# Patient Record
Sex: FEMALE | Race: BLACK OR AFRICAN AMERICAN | NOT HISPANIC OR LATINO | URBAN - METROPOLITAN AREA
[De-identification: names, ages, dates, MRNs, and addresses within clinical notes are randomized per-mention and may not be internally consistent; named-entity substitution may affect disease eponyms.]

---

## 2017-11-21 ENCOUNTER — EMERGENCY (EMERGENCY)
Facility: HOSPITAL | Age: 52
LOS: 1 days | Discharge: HOME | End: 2017-11-21
Attending: EMERGENCY MEDICINE
Payer: COMMERCIAL

## 2017-11-21 VITALS
OXYGEN SATURATION: 100 % | DIASTOLIC BLOOD PRESSURE: 86 MMHG | WEIGHT: 175.05 LBS | HEART RATE: 94 BPM | TEMPERATURE: 98 F | HEIGHT: 66 IN | SYSTOLIC BLOOD PRESSURE: 137 MMHG | RESPIRATION RATE: 20 BRPM

## 2017-11-21 DIAGNOSIS — R42 DIZZINESS AND GIDDINESS: ICD-10-CM

## 2017-11-21 DIAGNOSIS — R00.2 PALPITATIONS: ICD-10-CM

## 2017-11-21 LAB
ANION GAP SERPL CALC-SCNC: 8 MMOL/L — SIGNIFICANT CHANGE UP (ref 5–17)
BASOPHILS # BLD AUTO: 0.1 K/UL — SIGNIFICANT CHANGE UP (ref 0–0.2)
BASOPHILS NFR BLD AUTO: 1.3 % — SIGNIFICANT CHANGE UP (ref 0–2)
BUN SERPL-MCNC: 11 MG/DL — SIGNIFICANT CHANGE UP (ref 7–18)
CALCIUM SERPL-MCNC: 8.9 MG/DL — SIGNIFICANT CHANGE UP (ref 8.4–10.5)
CHLORIDE SERPL-SCNC: 105 MMOL/L — SIGNIFICANT CHANGE UP (ref 96–108)
CO2 SERPL-SCNC: 26 MMOL/L — SIGNIFICANT CHANGE UP (ref 22–31)
CREAT SERPL-MCNC: 0.65 MG/DL — SIGNIFICANT CHANGE UP (ref 0.5–1.3)
D DIMER BLD IA.RAPID-MCNC: <150 NG/ML DDU — SIGNIFICANT CHANGE UP
EOSINOPHIL # BLD AUTO: 0 K/UL — SIGNIFICANT CHANGE UP (ref 0–0.5)
EOSINOPHIL NFR BLD AUTO: 0.5 % — SIGNIFICANT CHANGE UP (ref 0–6)
GLUCOSE SERPL-MCNC: 88 MG/DL — SIGNIFICANT CHANGE UP (ref 70–99)
HCT VFR BLD CALC: 38.7 % — SIGNIFICANT CHANGE UP (ref 34.5–45)
HGB BLD-MCNC: 12.2 G/DL — SIGNIFICANT CHANGE UP (ref 11.5–15.5)
LYMPHOCYTES # BLD AUTO: 1.4 K/UL — SIGNIFICANT CHANGE UP (ref 1–3.3)
LYMPHOCYTES # BLD AUTO: 26.6 % — SIGNIFICANT CHANGE UP (ref 13–44)
MCHC RBC-ENTMCNC: 21.5 PG — LOW (ref 27–34)
MCHC RBC-ENTMCNC: 31.6 GM/DL — LOW (ref 32–36)
MCV RBC AUTO: 68 FL — LOW (ref 80–100)
MONOCYTES # BLD AUTO: 0.4 K/UL — SIGNIFICANT CHANGE UP (ref 0–0.9)
MONOCYTES NFR BLD AUTO: 8.3 % — SIGNIFICANT CHANGE UP (ref 2–14)
NEUTROPHILS # BLD AUTO: 3.3 K/UL — SIGNIFICANT CHANGE UP (ref 1.8–7.4)
NEUTROPHILS NFR BLD AUTO: 63.3 % — SIGNIFICANT CHANGE UP (ref 43–77)
PLATELET # BLD AUTO: 197 K/UL — SIGNIFICANT CHANGE UP (ref 150–400)
POTASSIUM SERPL-MCNC: 3.3 MMOL/L — LOW (ref 3.5–5.3)
POTASSIUM SERPL-SCNC: 3.3 MMOL/L — LOW (ref 3.5–5.3)
RBC # BLD: 5.7 M/UL — HIGH (ref 3.8–5.2)
RBC # FLD: 15.4 % — HIGH (ref 10.3–14.5)
SODIUM SERPL-SCNC: 139 MMOL/L — SIGNIFICANT CHANGE UP (ref 135–145)
TROPONIN I SERPL-MCNC: <0.015 NG/ML — SIGNIFICANT CHANGE UP (ref 0–0.04)
WBC # BLD: 5.3 K/UL — SIGNIFICANT CHANGE UP (ref 3.8–10.5)
WBC # FLD AUTO: 5.3 K/UL — SIGNIFICANT CHANGE UP (ref 3.8–10.5)

## 2017-11-21 PROCEDURE — 70450 CT HEAD/BRAIN W/O DYE: CPT

## 2017-11-21 PROCEDURE — 80048 BASIC METABOLIC PNL TOTAL CA: CPT

## 2017-11-21 PROCEDURE — 85027 COMPLETE CBC AUTOMATED: CPT

## 2017-11-21 PROCEDURE — 82962 GLUCOSE BLOOD TEST: CPT

## 2017-11-21 PROCEDURE — 85379 FIBRIN DEGRADATION QUANT: CPT

## 2017-11-21 PROCEDURE — 93005 ELECTROCARDIOGRAM TRACING: CPT

## 2017-11-21 PROCEDURE — 71020: CPT | Mod: 26

## 2017-11-21 PROCEDURE — 99284 EMERGENCY DEPT VISIT MOD MDM: CPT | Mod: 25

## 2017-11-21 PROCEDURE — 96374 THER/PROPH/DIAG INJ IV PUSH: CPT

## 2017-11-21 PROCEDURE — 84484 ASSAY OF TROPONIN QUANT: CPT

## 2017-11-21 PROCEDURE — 99285 EMERGENCY DEPT VISIT HI MDM: CPT

## 2017-11-21 PROCEDURE — 70450 CT HEAD/BRAIN W/O DYE: CPT | Mod: 26

## 2017-11-21 PROCEDURE — 71046 X-RAY EXAM CHEST 2 VIEWS: CPT

## 2017-11-21 RX ORDER — MECLIZINE HCL 12.5 MG
25 TABLET ORAL ONCE
Qty: 0 | Refills: 0 | Status: COMPLETED | OUTPATIENT
Start: 2017-11-21 | End: 2017-11-21

## 2017-11-21 RX ORDER — KETOROLAC TROMETHAMINE 30 MG/ML
30 SYRINGE (ML) INJECTION ONCE
Qty: 0 | Refills: 0 | Status: DISCONTINUED | OUTPATIENT
Start: 2017-11-21 | End: 2017-11-21

## 2017-11-21 RX ADMIN — Medication 25 MILLIGRAM(S): at 17:28

## 2017-11-21 RX ADMIN — Medication 30 MILLIGRAM(S): at 17:29

## 2017-11-21 NOTE — ED PROVIDER NOTE - OBJECTIVE STATEMENT
53 y/o F pt with a significant PMHx of anemia and no significant PSHx presents to the ED c/o dizziness described as room spinning associated with nausea and palpitations, while at work today. Pt also reports feeling flushed notes numbness in arms bilaterally. Upon arrival to the ED, pt notes her dizziness is resolved, and that she currently only complaining of palpitations. Pt denies chest pain or any other complaints. NKDA.

## 2017-11-21 NOTE — ED PROVIDER NOTE - PROGRESS NOTE DETAILS
Pt reassessed, reports feeling better.  Dizziness and palpitations resolved.  Labs and imaging negative.  pt given copy of all results.  Plans to follow up with PMD.  Will d/c

## 2018-11-01 ENCOUNTER — EMERGENCY (EMERGENCY)
Facility: HOSPITAL | Age: 53
LOS: 1 days | Discharge: HOME | End: 2018-11-01
Attending: EMERGENCY MEDICINE
Payer: SELF-PAY

## 2018-11-01 VITALS
WEIGHT: 171.96 LBS | HEIGHT: 66 IN | RESPIRATION RATE: 18 BRPM | DIASTOLIC BLOOD PRESSURE: 100 MMHG | SYSTOLIC BLOOD PRESSURE: 158 MMHG | HEART RATE: 80 BPM | OXYGEN SATURATION: 100 % | TEMPERATURE: 99 F

## 2018-11-01 VITALS
SYSTOLIC BLOOD PRESSURE: 146 MMHG | OXYGEN SATURATION: 100 % | DIASTOLIC BLOOD PRESSURE: 84 MMHG | RESPIRATION RATE: 18 BRPM | HEART RATE: 63 BPM

## 2018-11-01 DIAGNOSIS — Z98.51 TUBAL LIGATION STATUS: Chronic | ICD-10-CM

## 2018-11-01 PROBLEM — D64.9 ANEMIA, UNSPECIFIED: Chronic | Status: ACTIVE | Noted: 2017-11-21

## 2018-11-01 PROCEDURE — 72100 X-RAY EXAM L-S SPINE 2/3 VWS: CPT

## 2018-11-01 PROCEDURE — 99284 EMERGENCY DEPT VISIT MOD MDM: CPT | Mod: 25

## 2018-11-01 PROCEDURE — 99284 EMERGENCY DEPT VISIT MOD MDM: CPT

## 2018-11-01 PROCEDURE — 72100 X-RAY EXAM L-S SPINE 2/3 VWS: CPT | Mod: 26

## 2018-11-01 PROCEDURE — 20552 NJX 1/MLT TRIGGER POINT 1/2: CPT

## 2018-11-01 RX ORDER — METHOCARBAMOL 500 MG/1
1 TABLET, FILM COATED ORAL
Qty: 30 | Refills: 0 | OUTPATIENT
Start: 2018-11-01 | End: 2018-11-10

## 2018-11-01 RX ORDER — IBUPROFEN 200 MG
1 TABLET ORAL
Qty: 40 | Refills: 0 | OUTPATIENT
Start: 2018-11-01 | End: 2018-11-10

## 2018-11-01 RX ORDER — GABAPENTIN 400 MG/1
400 CAPSULE ORAL ONCE
Qty: 0 | Refills: 0 | Status: COMPLETED | OUTPATIENT
Start: 2018-11-01 | End: 2018-11-01

## 2018-11-01 RX ORDER — METHOCARBAMOL 500 MG/1
750 TABLET, FILM COATED ORAL ONCE
Qty: 0 | Refills: 0 | Status: COMPLETED | OUTPATIENT
Start: 2018-11-01 | End: 2018-11-01

## 2018-11-01 RX ORDER — IBUPROFEN 200 MG
600 TABLET ORAL ONCE
Qty: 0 | Refills: 0 | Status: COMPLETED | OUTPATIENT
Start: 2018-11-01 | End: 2018-11-01

## 2018-11-01 RX ADMIN — Medication 600 MILLIGRAM(S): at 13:23

## 2018-11-01 RX ADMIN — Medication 40 MILLIGRAM(S): at 13:39

## 2018-11-01 RX ADMIN — METHOCARBAMOL 750 MILLIGRAM(S): 500 TABLET, FILM COATED ORAL at 13:23

## 2018-11-01 RX ADMIN — GABAPENTIN 400 MILLIGRAM(S): 400 CAPSULE ORAL at 13:22

## 2018-11-01 NOTE — ED PROVIDER NOTE - OBJECTIVE STATEMENT
Pt is a 52 y/o F presenting to the ED with c/o right lower back pain, sudden onset today. 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 She has been having constant pain since onset with radiation of pain to the RLE. Denies abd pain, CP, SOB, fever, numbness/tingling/weakness, bladder/bowel dysfunction, urinary sxs, and saddle anesthesia. No prior back injuries. Did not take pain meds prior to arrival.

## 2018-11-01 NOTE — CONSULT NOTE ADULT - SUBJECTIVE AND OBJECTIVE BOX
HPI: Patient is a 54 y/o F presenting to the ED with c/o right lower back pain, sudden onset today. 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 She has been having constant pain since onset with radiation of pain to the RLE.    PAST MEDICAL & SURGICAL HISTORY:  Anemia  Migraine  H/O tubal ligation    Review of systems: Non Contributory    MEDICATIONS  (STANDING):    Allergies: No known Allergies    Vital Signs Last 24 Hrs  T(C): 37.2 (01 Nov 2018 12:24), Max: 37.2 (01 Nov 2018 12:24)  T(F): 98.9 (01 Nov 2018 12:24), Max: 98.9 (01 Nov 2018 12:24)  HR: 63 (01 Nov 2018 15:00) (63 - 80)  BP: 146/84 (01 Nov 2018 15:00) (146/84 - 158/100)  BP(mean): --  RR: 18 (01 Nov 2018 15:00) (18 - 18)  SpO2: 100% (01 Nov 2018 15:00) (100% - 100%)    Physical Examination:    Musculoskeletal:   Physical examination of lower back/lumbar spine area shows local point tenderness to palpation of the right lower lumbar/SI joint area. There is palpable right paravertebral muscle spasm noted. Range of motion is decreased due to pain.     Neurovascularly Intact    RADIOLOGY & ADDITIONAL STUDIES: X-ray of Lumbar spine done in the ER shows: Slight scattered spondylitic change.    ASSESSMENT: Lumbar sprain, right sided SI joint pain/ right paravertebral lower lumbar muscle spasm, s/p work related injury.      PLAN/RECOMMENDATION: Conservative management, Rest, Apply warm compress. Take anti-inflammatory medication and muscle relaxants. Apply Voltaren gel.     Recommend MRI of Lumbar spine in case of continuation of symptoms.     FOLLOW UP: With office in 1-2 weeks HPI: Patient is a 54 y/o F presenting to the ED with c/o right lower back pain, sudden onset today. 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 She has been having constant pain since onset with radiation of pain to the RLE.    PAST MEDICAL & SURGICAL HISTORY:  Anemia  Migraine  H/O tubal ligation    Review of systems: Non Contributory    MEDICATIONS  (STANDING):    Allergies: No known Allergies    Vital Signs Last 24 Hrs  T(C): 37.2 (01 Nov 2018 12:24), Max: 37.2 (01 Nov 2018 12:24)  T(F): 98.9 (01 Nov 2018 12:24), Max: 98.9 (01 Nov 2018 12:24)  HR: 63 (01 Nov 2018 15:00) (63 - 80)  BP: 146/84 (01 Nov 2018 15:00) (146/84 - 158/100)  BP(mean): --  RR: 18 (01 Nov 2018 15:00) (18 - 18)  SpO2: 100% (01 Nov 2018 15:00) (100% - 100%)    Physical Examination:    Musculoskeletal:   Physical examination of lower back/lumbar spine area shows local point tenderness to palpation of the right lower lumbar/SI joint area. There is palpable right paravertebral muscle spasm noted. Range of motion is decreased due to pain.     Neurovascularly Intact    RADIOLOGY & ADDITIONAL STUDIES: X-ray of Lumbar spine done in the ER shows: Slight scattered spondylitic change.    ASSESSMENT: Lumbar sprain, right sided SI joint pain/ right paravertebral lower lumbar muscle spasm, s/p work related injury.      PLAN/RECOMMENDATION: Under sterile condition, right lower lumbar/SI joint trigger point area was injected with 40 mg of Depomedrol and 5 CC of lidocaine. Patient tolerated procedure well and expressed relief of pain.    Rest, Apply warm compress. Take anti-inflammatory medication and muscle relaxants. Apply Voltaren gel.     Recommend MRI of Lumbar spine in case of continuation of symptoms.     FOLLOW UP: With office in 1-2 weeks

## 2018-11-01 NOTE — ED PROVIDER NOTE - CHPI ED SYMPTOMS NEG
no motor function loss/no anorexia/no bowel dysfunction/no fatigue/no numbness/no neck tenderness/no constipation/no bladder dysfunction/no tingling/no difficulty bearing weight

## 2018-11-01 NOTE — ED PROVIDER NOTE - MUSCULOSKELETAL, MLM
tenderness to the right lower lumbar paraspinal, right cervical paraspinal tenderness, right elbow tenderness, no midline tenderness,

## 2019-02-14 ENCOUNTER — EMERGENCY (EMERGENCY)
Facility: HOSPITAL | Age: 54
LOS: 0 days | Discharge: LEFT BEFORE DISCHARGE | End: 2019-02-14
Attending: EMERGENCY MEDICINE
Payer: SELF-PAY

## 2019-02-14 VITALS
HEART RATE: 94 BPM | TEMPERATURE: 98 F | DIASTOLIC BLOOD PRESSURE: 80 MMHG | HEIGHT: 66 IN | RESPIRATION RATE: 17 BRPM | OXYGEN SATURATION: 99 % | SYSTOLIC BLOOD PRESSURE: 134 MMHG

## 2019-02-14 DIAGNOSIS — Z98.51 TUBAL LIGATION STATUS: Chronic | ICD-10-CM

## 2019-02-14 PROCEDURE — 99283 EMERGENCY DEPT VISIT LOW MDM: CPT | Mod: 25

## 2019-02-14 PROCEDURE — 99053 MED SERV 10PM-8AM 24 HR FAC: CPT

## 2019-02-14 RX ORDER — METHOCARBAMOL 500 MG/1
1 TABLET, FILM COATED ORAL
Qty: 15 | Refills: 0 | OUTPATIENT
Start: 2019-02-14 | End: 2019-02-18

## 2019-02-14 RX ORDER — IBUPROFEN 200 MG
1 TABLET ORAL
Qty: 20 | Refills: 0 | OUTPATIENT
Start: 2019-02-14 | End: 2019-02-18

## 2019-02-14 NOTE — ED ADULT NURSE NOTE - NSIMPLEMENTINTERV_GEN_ALL_ED
Implemented All Fall with Harm Risk Interventions:  [place] to call system. Call bell, personal items and telephone within reach. Instruct patient to call for assistance. Room bathroom lighting operational. Non-slip footwear when patient is off stretcher. Physically safe environment: no spills, clutter or unnecessary equipment. Stretcher in lowest position, wheels locked, appropriate side rails in place. Provide visual cue, wrist band, yellow gown, etc. Monitor gait and stability. Monitor for mental status changes and reorient to person, place, and time. Review medications for side effects contributing to fall risk. Reinforce activity limits and safety measures with patient and family. Provide visual clues: red socks.

## 2019-02-14 NOTE — ED PROVIDER NOTE - OBJECTIVE STATEMENT
Pertinent PMH/PSH/FHx/SHx and Review of Systems contained within:  Patient presents to the ED for abdominal pain.  Patient is s/p liposuction surgery on 2/5 in [place].  Came to the ER because of increased pain and pressure in the abdomen.  Says that her wound vac which was placed on the right side has not been draining.  She denies any fevers, nausea, vomiting, diarrhea, vaginal discharge, or bleeding.  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      ROS: No fever/chills, No headache/photophobia/eye pain/changes in vision, No ear pain/sore throat/dysphagia, No chest pain/palpitations, no SOB/cough/wheeze/stridor, No N/V/D/melena, no dysuria/frequency/discharge, No neck/back pain, no rash, no changes in neurological status/function.

## 2019-02-14 NOTE — ED PROVIDER NOTE - PHYSICAL EXAMINATION
Gen: Alert, moderate distress on walking, well appearing  Head: NC, AT, normal lids/conjunctiva  ENT: normal hearing, patent oropharynx without erythema/exudate, uvula midline  Neck: +supple, no tenderness/meningismus/JVD, +Trachea midline  Pulm: Bilateral BS, normal resp effort, no wheeze/stridor/retractions  CV: RRR, no M/R/G, +dist pulses  Abd: soft, tender to palpation both sides of abdomen, +RADHA drain on right side, lower abdominal incision appears clean and healing well, ND, no palpable masses  Mskel: no edema/erythema/cyanosis  Skin: no rash, warm/dry  Neuro: AAOx3, no apparent sensory/motor deficits, coordination intact

## 2019-02-14 NOTE — ED PROVIDER NOTE - CLINICAL SUMMARY MEDICAL DECISION MAKING FREE TEXT BOX
Patient presents to ER for post op abdominal pain.  VSS.  Refusing work up in ER, expresses understanding that drains cannot be changed in ER without further evaluation.   The patient has decided to leave against medical advice.  The patient is AAOx3, not intoxicated, and displays normal decision making ability. We discussed all risks, benefits, and alternatives to the progression of treatment and the potential dangers of leaving including but not limited to permanent disability, injury, and death.  The patient was instructed that they are welcome to change their decision to leave against medical advice and return to the emergency department at any time and for any reason in order to allow us to render care.  Patient given information for Dr. Payton prior to leaving.

## 2019-02-14 NOTE — ED ADULT NURSE NOTE - OBJECTIVE STATEMENT
Pt a&ox4. Pt explains that she had tummy tuck and liposuction Feb 5th, 2019 in [place].  She flew back into [place] last night.  Pt  said, "when I got home. I can see the fluid isn't coming.  I feel all the pain on the side of my waist. The drain is not good."  Pt c/o dislodges drain and pain.

## 2019-02-14 NOTE — ED ADULT TRIAGE NOTE - CHIEF COMPLAINT QUOTE
Pt presents one week s/p tummy tuck. states the drain fell out and the fluid is leaking into her abd and causing a sharp pain. no fevers or chills. is wearing a binder. states wound is clean, no odor.

## 2019-02-15 DIAGNOSIS — D64.9 ANEMIA, UNSPECIFIED: ICD-10-CM

## 2019-02-15 DIAGNOSIS — R10.9 UNSPECIFIED ABDOMINAL PAIN: ICD-10-CM

## 2019-02-15 DIAGNOSIS — G43.909 MIGRAINE, UNSPECIFIED, NOT INTRACTABLE, WITHOUT STATUS MIGRAINOSUS: ICD-10-CM

## 2021-01-30 DIAGNOSIS — Z01.818 ENCOUNTER FOR OTHER PREPROCEDURAL EXAMINATION: ICD-10-CM

## 2021-02-02 ENCOUNTER — APPOINTMENT (OUTPATIENT)
Dept: DISASTER EMERGENCY | Facility: CLINIC | Age: 56
End: 2021-02-02

## 2024-10-30 NOTE — ED PROVIDER NOTE - ENDOCRINE NEGATIVE STATEMENT, MLM

## 2025-05-22 NOTE — ED PROVIDER NOTE - MUSCULOSKELETAL [+], MLM
MD made aware; Duoneb ordered;
MD to come to see the patient this AM.
will pass to next team. could be reaction to abx. continue to monitor
Transfuse Pt with PRBC's at this time. No other new orders at this time.
Patient seen and examined by MD at bedside; PO Tylenol 650 mG ordered and given;
BACK PAIN